# Patient Record
Sex: MALE | Race: WHITE
[De-identification: names, ages, dates, MRNs, and addresses within clinical notes are randomized per-mention and may not be internally consistent; named-entity substitution may affect disease eponyms.]

---

## 2018-05-07 ENCOUNTER — HOSPITAL ENCOUNTER (OUTPATIENT)
Dept: HOSPITAL 53 - M OPP | Age: 68
Discharge: HOME | End: 2018-05-07
Attending: INTERNAL MEDICINE
Payer: MEDICARE

## 2018-05-07 DIAGNOSIS — D12.3: ICD-10-CM

## 2018-05-07 DIAGNOSIS — K64.8: ICD-10-CM

## 2018-05-07 DIAGNOSIS — Z12.11: Primary | ICD-10-CM

## 2018-05-07 DIAGNOSIS — Z79.899: ICD-10-CM

## 2018-05-07 DIAGNOSIS — K57.30: ICD-10-CM

## 2018-05-07 DIAGNOSIS — M54.89: ICD-10-CM

## 2018-05-07 DIAGNOSIS — M19.90: ICD-10-CM

## 2018-05-07 DIAGNOSIS — Z80.0: ICD-10-CM

## 2018-05-07 DIAGNOSIS — F32.9: ICD-10-CM

## 2018-05-07 DIAGNOSIS — F17.290: ICD-10-CM

## 2018-05-07 PROCEDURE — 45385 COLONOSCOPY W/LESION REMOVAL: CPT

## 2018-05-07 RX ADMIN — SODIUM CHLORIDE 1 MLS/HR: 9 INJECTION, SOLUTION INTRAVENOUS at 12:45

## 2021-04-10 ENCOUNTER — HOSPITAL ENCOUNTER (EMERGENCY)
Dept: HOSPITAL 53 - M ED | Age: 71
Discharge: HOME | End: 2021-04-10
Payer: MEDICARE

## 2021-04-10 VITALS — DIASTOLIC BLOOD PRESSURE: 98 MMHG | SYSTOLIC BLOOD PRESSURE: 157 MMHG

## 2021-04-10 VITALS — BODY MASS INDEX: 25.98 KG/M2 | HEIGHT: 69 IN | WEIGHT: 175.38 LBS

## 2021-04-10 DIAGNOSIS — R51.9: Primary | ICD-10-CM

## 2021-04-10 DIAGNOSIS — F32.9: ICD-10-CM

## 2021-04-10 DIAGNOSIS — Z79.899: ICD-10-CM

## 2021-04-10 DIAGNOSIS — E78.5: ICD-10-CM

## 2021-04-10 LAB
ALBUMIN SERPL BCG-MCNC: 3.8 GM/DL (ref 3.2–5.2)
ALT SERPL W P-5'-P-CCNC: 31 U/L (ref 12–78)
BILIRUB SERPL-MCNC: 0.3 MG/DL (ref 0.2–1)
BUN SERPL-MCNC: 20 MG/DL (ref 7–18)
CALCIUM SERPL-MCNC: 9.1 MG/DL (ref 8.8–10.2)
CHLORIDE SERPL-SCNC: 113 MEQ/L (ref 98–107)
CO2 SERPL-SCNC: 24 MEQ/L (ref 21–32)
CREAT SERPL-MCNC: 1.08 MG/DL (ref 0.7–1.3)
CRP SERPL-MCNC: 0.3 MG/DL (ref 0–0.3)
ERYTHROCYTE [SEDIMENTATION RATE] IN BLOOD BY WESTERGREN METHOD: 4 MM/HR (ref 0–20)
GFR SERPL CREATININE-BSD FRML MDRD: > 60 ML/MIN/{1.73_M2} (ref 42–?)
GLUCOSE SERPL-MCNC: 103 MG/DL (ref 70–100)
HCT VFR BLD AUTO: 48.3 % (ref 42–52)
HGB BLD-MCNC: 16 G/DL (ref 13.5–17.5)
MCH RBC QN AUTO: 30.2 PG (ref 27–33)
MCHC RBC AUTO-ENTMCNC: 33.1 G/DL (ref 32–36.5)
MCV RBC AUTO: 91.1 FL (ref 80–96)
PLATELET # BLD AUTO: 284 10^3/UL (ref 150–450)
POTASSIUM SERPL-SCNC: 4.8 MEQ/L (ref 3.5–5.1)
PROT SERPL-MCNC: 7.2 GM/DL (ref 6.4–8.2)
RBC # BLD AUTO: 5.3 10^6/UL (ref 4.3–6.1)
SODIUM SERPL-SCNC: 139 MEQ/L (ref 136–145)
WBC # BLD AUTO: 11.8 10^3/UL (ref 4–10)

## 2021-04-10 PROCEDURE — 70450 CT HEAD/BRAIN W/O DYE: CPT

## 2021-04-10 PROCEDURE — 80053 COMPREHEN METABOLIC PANEL: CPT

## 2021-04-10 PROCEDURE — 96365 THER/PROPH/DIAG IV INF INIT: CPT

## 2021-04-10 PROCEDURE — 96375 TX/PRO/DX INJ NEW DRUG ADDON: CPT

## 2021-04-10 PROCEDURE — 85652 RBC SED RATE AUTOMATED: CPT

## 2021-04-10 PROCEDURE — 85027 COMPLETE CBC AUTOMATED: CPT

## 2021-04-10 PROCEDURE — 86140 C-REACTIVE PROTEIN: CPT

## 2021-04-10 PROCEDURE — 99284 EMERGENCY DEPT VISIT MOD MDM: CPT

## 2021-04-10 NOTE — REPVR
PROCEDURE INFORMATION: 

Exam: CT Head Without Contrast 

Exam date and time: 4/10/2021 6:57 PM 

Age: 70 years old 

Clinical indication: Pain; Headache 



TECHNIQUE: 

Imaging protocol: Computed tomography of the head without contrast. 

Radiation optimization: All CT scans at this facility use at least one of these 

dose optimization techniques: automated exposure control; mA and/or kV 

adjustment per patient size (includes targeted exams where dose is matched to 

clinical indication); or iterative reconstruction. 



COMPARISON: 

No relevant prior studies available. 



FINDINGS: 

Brain: There is no CT evidence for an acute large vessel territorial infarct. 

No acute intracranial hemorrhage is seen. No mass effect, midline shift, or 

herniation is noted. There are non-specific foci of low attenuation in the 

periventricular and subcortical white matter, which are likely the sequela of 

chronic small vessel ischemic injury. 

Cerebral ventricles: Normal for age. No hydrocephalus. 

Bones/joints: The skull is intact. No suspicious osteolytic or osteoblastic 

lesion. 

Paranasal sinuses: The imaged portions of the sinuses are well-aerated. No 

air-fluid levels are noted in the sinuses. 

Mastoid air cells: Clear. 

Auditory system: There are soft tissues opacities in the external auditory 

canals, which likely represent cerumen. The middle ear spaces are clear. 

Soft tissues: Unremarkable. No soft tissue fluid collection. 



IMPRESSION: 

No acute intracranial abnormality. 



Electronically signed by: Frank Pink On 04/10/2021  19:46:37 PM